# Patient Record
(demographics unavailable — no encounter records)

---

## 2024-10-28 NOTE — ADDENDUM
[FreeTextEntry1] : I, Geneva Mccormack, acted solely as a scribe for Dr. Larry Westbrook MD on this date 10/28/2024 .   All medical record entries made by the Scribe were at my, Dr. Larry Westbrook MD., direction and personally dictated by me on 03/06/2024. I have reviewed the chart and agree that the record accurately reflects my personal performance of the history, physical exam, assessment and plan. I have also personally directed, reviewed, and agreed with the chart.

## 2024-10-28 NOTE — ASSESSMENT
[FreeTextEntry1] : I had a lengthy discussion with the patient in regard to treatment plan and diagnosis. There are no red flag findings on imaging nor are there any red flag findings on clinical exams. Therefore, we will proceed with a course of conservative treatment. This would include physical therapy/home exercise program, Tylenol, Tizanidine, NSAIDs as medically indicated. Patient is encouraged to follow up with Dr. Cervantes for her right hip and right lower extremity symptoms. The patient will follow up with me in approximately 3 months. I encouraged the patient to follow-up sooner if there are any new or worsening symptoms.

## 2024-10-28 NOTE — PHYSICAL EXAM
[de-identified] : Lumbar Physical Exam  Gait - Normal  Station - Normal  Sagittal balance - Normal  Compensatory mechanism? - None  Heel walk - Normal  Toe walk - Normal  Reflexes Patellar - normal Gastroc - normal Clonus - No  Hip Exam - Normal  Straight leg raise - none  Pulses - 2+ dp/pt  Range of motion - normal  Sensation  Sensation intact to light touch in L1, L2, L3, L4, L5 and S1 dermatomes bilaterally  Motor 	IP	Quad	HS	TA	Gastroc	EHL Right	5/5	5/5	5/5	5/5	5/5	5/5 Left	5/5	5/5	5/5	5/5	5/5	5/5 [de-identified] : Lumbar MRI No critical areas of stenosis  RT foraminal stenosis  Lumbar MRI reviewed  There is foraminal stenosis at multiple levels No areas of central stenosis Left-sided foraminal stenosis has improved in my opinion.

## 2024-10-28 NOTE — HISTORY OF PRESENT ILLNESS
[de-identified] : Patient is a 70 year old female who presents for f/u eval of back pain. Patient reports of having recent surgery for anal fissures and endorses pain with performance of bowel movements, however she reports that this issue has been present prior to surgery. She also endorses to pain and electrical shocking down the right LE and right hip, worse when ascending from a chair and prolonged walking.  Patient reports a Hx of right total knee arthroplasty. Taking Tylenol and Tizanidine prn.  07.24.24 Patient is a 70 year old female who presents for f/u eval of back pain. Reports muscle pulling in back and spasms in RT arm. Details electrical shocking in RRT LE. Overall, she states she is doing okay. Taking Tylenol and Tizanidine prn. Reports exacerbation of RT hip with prolonged walking.   06.12.24 Patient is a 70 year old female who presents for f/u eval of bp. Today patient reports intermittent pain. Details persistent LT calf cramping and pain in RT hip. States cramping occurs spontaneously. Continuing with ADLs.    05.20.24 Patient is a 70 year old female who presents for f/u eval of back pain. Patient reports intermittent RT LE sxs and details acute cramping of LT LE with ankle eversion.     Patient is a 70 year old female who presents for f/u eval of her back pain. Today, she reports improvement of her sxs. Her sxs are intermittent, no longer experiencing constant pain. Pain exacerbated by long periods of walking. Continuing with PT to manage pain. Takes Tylenol with pain.   12.20.23 Patient is a 70 year old female who presents for follow up evaluation of her back pain. Today, she reports still experiencing pain. Details muscle pain/cramping in LT calf, exacerbated when stretching. Pain worse late at night. She also has pain in her lower back with long periods of standing. Tizanidine not helping sxs.   11.29.2023 Today the patient reports that since her last visit she had improvement in her back pain initially.  Unfortunate over the past 2 to 3 weeks she has had a return of symptoms.  She describes pain of her low back.  This is the predominant area of her symptoms.  It is right over her right posterior superior iliac spine.  She denies any severe radiating type symptoms.  She denies any weakness.  She denies any bowel bladder issues.  She is here today to talk about her options for treatment

## 2024-10-31 NOTE — HISTORY OF PRESENT ILLNESS
[FreeTextEntry1] : Status post stricture dilation and Botox injection. Patient progressing well. Minimal discomfort and bleeding had almost completely stopped

## 2024-10-31 NOTE — ASSESSMENT
[FreeTextEntry1] : Anal fissure and stenosis -Patient progressing well -Continue fiber supplement and definitely -High fiber diet -Follow up as needed

## 2024-11-11 NOTE — DISCUSSION/SUMMARY
[de-identified] : Ania Gibbs is a 71-year-old female who presented to the office for evaluation of her right knee pain.  Patient underwent right TKA in 2020 by Dr. Hussein.  Patient is currently doing well at this time.  Prior x-rays showed right total knee arthroplasty in good position and alignment.  Examination showed good knee range of motion.  Discussed with patient the examination and imaging findings.  Discussed with patient that she is currently doing well.  She does not have significant knee pain at this time.  Patient will continue her home exercises.  She will participate in activities as tolerated.  Patient will follow-up in 1 year for reevaluation and management.  Patient understanding and in agreement the plan.  All questions answered  Plan: -Activities as Tolerated -Home Exercises -Follow-up in 1 year for reevaluation and management

## 2024-11-11 NOTE — HISTORY OF PRESENT ILLNESS
[de-identified] : 11/12/2024 11/7/2023  Ania Gibbs is a 70-year-old female presenting to the office for follow-up of her right knee pain.  Patient is doing well overall.  No significant knee pain at this time.  She did physical therapy and did well.  There have been no falls or injuries.  No fevers or chills.  Patient was previously seen by Dr. Westbrook for her back pain.  She does home exercises.  She is not taking pain medications at this time.  9/5/2023 Ania Gibbs is a 69 year old female who presented to the office for evaluation of her right knee pain. Patient underwent Right Total Knee in 2020 by Dr. Hussein. Patient reports pain in the knee and hip with prolonged walking.  Pain is worse with stairs and going down the stairs.  Pain is located over the anterior knee.  She can also have some right posterior hip pain with increased walking.  She is not having significant pain with walking on flat ground.  There is pain with knee flexion and with stairs.  Patient has been having knee pain for about 1 month.  She reports some decreased knee range of motion.  Patient does have a history of back pain and was previously seen by Dr. Westbrook for her back and anterior thigh pain, which improved with physical therapy.  No falls or injuries.  No knee instability.  No numbness tingling.  No fevers or chills.  History: HTN, Back Pain, Sinusitis

## 2024-11-11 NOTE — HISTORY OF PRESENT ILLNESS
[de-identified] : 11/12/2024 11/7/2023  Ania Gibbs is a 70-year-old female presenting to the office for follow-up of her right knee pain.  Patient is doing well overall.  No significant knee pain at this time.  She did physical therapy and did well.  There have been no falls or injuries.  No fevers or chills.  Patient was previously seen by Dr. Westbrook for her back pain.  She does home exercises.  She is not taking pain medications at this time.  9/5/2023 Ania Gibbs is a 69 year old female who presented to the office for evaluation of her right knee pain. Patient underwent Right Total Knee in 2020 by Dr. Hussein. Patient reports pain in the knee and hip with prolonged walking.  Pain is worse with stairs and going down the stairs.  Pain is located over the anterior knee.  She can also have some right posterior hip pain with increased walking.  She is not having significant pain with walking on flat ground.  There is pain with knee flexion and with stairs.  Patient has been having knee pain for about 1 month.  She reports some decreased knee range of motion.  Patient does have a history of back pain and was previously seen by Dr. Westbrook for her back and anterior thigh pain, which improved with physical therapy.  No falls or injuries.  No knee instability.  No numbness tingling.  No fevers or chills.  History: HTN, Back Pain, Sinusitis

## 2024-11-11 NOTE — PHYSICAL EXAM
[de-identified] : Constitutional:  71 year old female, alert and oriented, cooperative, in no acute distress.  HEENT  NC/AT.  Appearance: symmetric  Neck/Back Straight without deformity or instability.  Good ROM.  Chest/Respiratory  Respiratory effort: no intercostal retractions or use of accessory muscles. Nonlabored Breathing  Skin  On inspection, warm and dry without rashes or lesions.  Mental Status:  Judgment, insight: intact Orientation: oriented to time, place, and person  Neurological: Sensory and Motor are grossly intact throughout  Right Knee  Inspection:     Incision well healed. No erythema or drainage     No Effusion     Non-tender to palpation over tibial tubercle, patella, medial and lateral joint line, and pes insertion.  Range of Motion: 	Extension - 0 degrees 	Flexion - 120 degrees 	Alignment - Valgus 3 degrees 	Extensor lag: None  Stability:      Demonstrates no Varus or Valgus instability      Negative Anterior or Posterior drawer.      No mid flexion instability  Patella: stable, tracks well.   Neurologic Exam     Motor intact including 5/5 Extensor Hallucis Longus, 5/5 Flexor Hallucis Longus, 5/5 Tibialis Anterior and 5/5 Gastrocnemius     Sensation Intact to Light Touch including Saphenous, Sural, Superficial Peroneal, Deep Peroneal, Tibial nerve distributions  Vascular Exam     Foot is warm and well perfused with 2+ Dorsalis Pedis Pulse   No pain with range of motion of the bilateral hips or left knee. No lumbar paraspinal muscle tenderness. [de-identified] : XRay:  XRays of the Right Knee (3 Views) taken on 9/5/2023. XRays demonstrate a Right Total Knee Arthroplasty in good position and alignment. There is no obvious evidence of fracture, dislocation, osteolysis or loosening. (my personal interpretation)

## 2024-11-11 NOTE — HISTORY OF PRESENT ILLNESS
[de-identified] : 11/12/2024 11/7/2023  Ania Gibbs is a 70-year-old female presenting to the office for follow-up of her right knee pain.  Patient is doing well overall.  No significant knee pain at this time.  She did physical therapy and did well.  There have been no falls or injuries.  No fevers or chills.  Patient was previously seen by Dr. Westbrook for her back pain.  She does home exercises.  She is not taking pain medications at this time.  9/5/2023 Ania Gibbs is a 69 year old female who presented to the office for evaluation of her right knee pain. Patient underwent Right Total Knee in 2020 by Dr. Hussein. Patient reports pain in the knee and hip with prolonged walking.  Pain is worse with stairs and going down the stairs.  Pain is located over the anterior knee.  She can also have some right posterior hip pain with increased walking.  She is not having significant pain with walking on flat ground.  There is pain with knee flexion and with stairs.  Patient has been having knee pain for about 1 month.  She reports some decreased knee range of motion.  Patient does have a history of back pain and was previously seen by Dr. Westbrook for her back and anterior thigh pain, which improved with physical therapy.  No falls or injuries.  No knee instability.  No numbness tingling.  No fevers or chills.  History: HTN, Back Pain, Sinusitis

## 2024-11-11 NOTE — DISCUSSION/SUMMARY
[de-identified] : Ania Gibbs is a 71-year-old female who presented to the office for evaluation of her right knee pain.  Patient underwent right TKA in 2020 by Dr. Hussein.  Patient is currently doing well at this time.  Prior x-rays showed right total knee arthroplasty in good position and alignment.  Examination showed good knee range of motion.  Discussed with patient the examination and imaging findings.  Discussed with patient that she is currently doing well.  She does not have significant knee pain at this time.  Patient will continue her home exercises.  She will participate in activities as tolerated.  Patient will follow-up in 1 year for reevaluation and management.  Patient understanding and in agreement the plan.  All questions answered  Plan: -Activities as Tolerated -Home Exercises -Follow-up in 1 year for reevaluation and management

## 2024-11-11 NOTE — PHYSICAL EXAM
[de-identified] : Constitutional:  71 year old female, alert and oriented, cooperative, in no acute distress.  HEENT  NC/AT.  Appearance: symmetric  Neck/Back Straight without deformity or instability.  Good ROM.  Chest/Respiratory  Respiratory effort: no intercostal retractions or use of accessory muscles. Nonlabored Breathing  Skin  On inspection, warm and dry without rashes or lesions.  Mental Status:  Judgment, insight: intact Orientation: oriented to time, place, and person  Neurological: Sensory and Motor are grossly intact throughout  Right Knee  Inspection:     Incision well healed. No erythema or drainage     No Effusion     Non-tender to palpation over tibial tubercle, patella, medial and lateral joint line, and pes insertion.  Range of Motion: 	Extension - 0 degrees 	Flexion - 120 degrees 	Alignment - Valgus 3 degrees 	Extensor lag: None  Stability:      Demonstrates no Varus or Valgus instability      Negative Anterior or Posterior drawer.      No mid flexion instability  Patella: stable, tracks well.   Neurologic Exam     Motor intact including 5/5 Extensor Hallucis Longus, 5/5 Flexor Hallucis Longus, 5/5 Tibialis Anterior and 5/5 Gastrocnemius     Sensation Intact to Light Touch including Saphenous, Sural, Superficial Peroneal, Deep Peroneal, Tibial nerve distributions  Vascular Exam     Foot is warm and well perfused with 2+ Dorsalis Pedis Pulse   No pain with range of motion of the bilateral hips or left knee. No lumbar paraspinal muscle tenderness. [de-identified] : XRay:  XRays of the Right Knee (3 Views) taken on 9/5/2023. XRays demonstrate a Right Total Knee Arthroplasty in good position and alignment. There is no obvious evidence of fracture, dislocation, osteolysis or loosening. (my personal interpretation)

## 2024-11-11 NOTE — PHYSICAL EXAM
[de-identified] : Constitutional:  71 year old female, alert and oriented, cooperative, in no acute distress.  HEENT  NC/AT.  Appearance: symmetric  Neck/Back Straight without deformity or instability.  Good ROM.  Chest/Respiratory  Respiratory effort: no intercostal retractions or use of accessory muscles. Nonlabored Breathing  Skin  On inspection, warm and dry without rashes or lesions.  Mental Status:  Judgment, insight: intact Orientation: oriented to time, place, and person  Neurological: Sensory and Motor are grossly intact throughout  Right Knee  Inspection:     Incision well healed. No erythema or drainage     No Effusion     Non-tender to palpation over tibial tubercle, patella, medial and lateral joint line, and pes insertion.  Range of Motion: 	Extension - 0 degrees 	Flexion - 120 degrees 	Alignment - Valgus 3 degrees 	Extensor lag: None  Stability:      Demonstrates no Varus or Valgus instability      Negative Anterior or Posterior drawer.      No mid flexion instability  Patella: stable, tracks well.   Neurologic Exam     Motor intact including 5/5 Extensor Hallucis Longus, 5/5 Flexor Hallucis Longus, 5/5 Tibialis Anterior and 5/5 Gastrocnemius     Sensation Intact to Light Touch including Saphenous, Sural, Superficial Peroneal, Deep Peroneal, Tibial nerve distributions  Vascular Exam     Foot is warm and well perfused with 2+ Dorsalis Pedis Pulse   No pain with range of motion of the bilateral hips or left knee. No lumbar paraspinal muscle tenderness. [de-identified] : XRay:  XRays of the Right Knee (3 Views) taken on 9/5/2023. XRays demonstrate a Right Total Knee Arthroplasty in good position and alignment. There is no obvious evidence of fracture, dislocation, osteolysis or loosening. (my personal interpretation)

## 2024-11-11 NOTE — DISCUSSION/SUMMARY
[de-identified] : Ania Gibbs is a 71-year-old female who presented to the office for evaluation of her right knee pain.  Patient underwent right TKA in 2020 by Dr. Hussein.  Patient is currently doing well at this time.  Prior x-rays showed right total knee arthroplasty in good position and alignment.  Examination showed good knee range of motion.  Discussed with patient the examination and imaging findings.  Discussed with patient that she is currently doing well.  She does not have significant knee pain at this time.  Patient will continue her home exercises.  She will participate in activities as tolerated.  Patient will follow-up in 1 year for reevaluation and management.  Patient understanding and in agreement the plan.  All questions answered  Plan: -Activities as Tolerated -Home Exercises -Follow-up in 1 year for reevaluation and management

## 2025-03-24 NOTE — ADDENDUM
[FreeTextEntry1] :  I, Doris Stewart, acted solely as a scribe for Dr. Larry Westbrook MD on this date 03/24/2025    All medical record entries made by the Scribe were at my, Dr. Larry Westbrook MD., direction and personally dictated by me on 03/24/2025 . I have reviewed the chart and agree that the record accurately reflects my personal performance of the history, physical exam, assessment and plan. I have also personally directed, reviewed, and agreed with the chart.

## 2025-03-24 NOTE — PHYSICAL EXAM
[de-identified] : Lumbar Physical Exam  Gait - Normal  Station - Normal  Sagittal balance - Normal  Compensatory mechanism? - None  Reflexes Patellar - normal Gastroc - normal Clonus - No  Hip Exam - Normal  Straight leg raise - none  Pulses - 2+ dp/pt  Range of motion - normal  Sensation  Sensation intact to light touch in L1, L2, L3, L4, L5 and S1 dermatomes bilaterally  Motor 	IP	Quad	HS	TA	Gastroc	EHL Right	5/5	5/5	5/5	5/5	5/5	5/5 Left	5/5	5/5	5/5	5/5	5/5	5/5 [de-identified] : previous imaging: Lumbar MRI No critical areas of stenosis  RT foraminal stenosis  Lumbar MRI reviewed  There is foraminal stenosis at multiple levels No areas of central stenosis Left-sided foraminal stenosis has improved in my opinion.

## 2025-03-24 NOTE — HISTORY OF PRESENT ILLNESS
[de-identified] : Patient is a 71 year old female who presents for f/u eval of back pain. She reports that she is doing better overall. She states that she recently had a cold. She states that she has occasional anterior right thigh. She reports that she has some back pain. She has not been attending physical therapy, however, she has been exercising at home. She followed up with Dr. Cervantes to evaluate her hips and there were no abnormal findings. She reports that her pain is exacerbated by washing dishes and vacuuming. Patient reports that walking up a hill for three blocks exacerbated her back pain.   10.28.24 Patient is a 70 year old female who presents for f/u eval of back pain. Patient reports of having recent surgery for anal fissures and endorses pain with performance of bowel movements, however she reports that this issue has been present prior to surgery. She also endorses to pain and electrical shocking down the right LE and right hip, worse when ascending from a chair and prolonged walking.  Patient reports a Hx of right total knee arthroplasty. Taking Tylenol and Tizanidine prn.  07.24.24 Patient is a 70 year old female who presents for f/u eval of back pain. Reports muscle pulling in back and spasms in RT arm. Details electrical shocking in RRT LE. Overall, she states she is doing okay. Taking Tylenol and Tizanidine prn. Reports exacerbation of RT hip with prolonged walking.   06.12.24 Patient is a 70 year old female who presents for f/u eval of bp. Today patient reports intermittent pain. Details persistent LT calf cramping and pain in RT hip. States cramping occurs spontaneously. Continuing with ADLs.    05.20.24 Patient is a 70 year old female who presents for f/u eval of back pain. Patient reports intermittent RT LE sxs and details acute cramping of LT LE with ankle eversion.     Patient is a 70 year old female who presents for f/u eval of her back pain. Today, she reports improvement of her sxs. Her sxs are intermittent, no longer experiencing constant pain. Pain exacerbated by long periods of walking. Continuing with PT to manage pain. Takes Tylenol with pain.   12.20.23 Patient is a 70 year old female who presents for follow up evaluation of her back pain. Today, she reports still experiencing pain. Details muscle pain/cramping in LT calf, exacerbated when stretching. Pain worse late at night. She also has pain in her lower back with long periods of standing. Tizanidine not helping sxs.   11.29.2023 Today the patient reports that since her last visit she had improvement in her back pain initially.  Unfortunate over the past 2 to 3 weeks she has had a return of symptoms.  She describes pain of her low back.  This is the predominant area of her symptoms.  It is right over her right posterior superior iliac spine.  She denies any severe radiating type symptoms.  She denies any weakness.  She denies any bowel bladder issues.  She is here today to talk about her options for treatment

## 2025-03-24 NOTE — ASSESSMENT
[FreeTextEntry1] :  I had a lengthy discussion with the patient in regards to treatment plan and diagnosis. There are no red flag findings on imaging nor are there any red flag findings on clinical exams.  Therefore we will proceed with a course of conservative treatment. This would include a home exercise program, Tylenol, NSAIDs as medically indicated.  The patient will follow up with me in approximately 6 months.  I encouraged the patient to follow-up sooner if there are any new or worsening symptoms.